# Patient Record
(demographics unavailable — no encounter records)

---

## 2024-10-08 NOTE — HISTORY OF PRESENT ILLNESS
[FreeTextEntry1] : 56 year old woman with CAD s/p PCI stenting, on DAPT, PPM, PFO s/p closure in 2022, HLD, HTN, DM, and stroke in 10/2022, NAVIN,  returning to neurology for follow up after stroke.  Last seen in March 2024.  No recurrent stroke symptoms at the time.  Returning today to report no new neurological symptoms.  Continues to have bouts of vertigo.  She feels that PT was very helpful in the past, and requesting a referral to PT again.  Otherwise, no new neuro complaints, including visual disturbance, speech difficulty, weakness in the arms and legs.   She does report persistent gait difficulty with the bouts of vertigo, and is concerned about walking in her home in the dark.    She does report some increase swelling in both legs after prolonged periods of walking or standing.  No other recent medical complaints, including chest pain, shortness of breath, abdominal pain.  No fevers, chills, weight loss.    Recently approved for disability.    PMHx: CAD: stents, takes DAPT HLD: crestor 40 HTN: HCTZ 25, Losartan 100m, amlodipine 10, metop 25 DM: jardiance, and insulin. Stroke  Shx: Lives alone. Not working. Denies tobacco and ETOH use.  On exam: GEN: NAD CV: rRR, s1, S2 PULM: CTAB NEURO: Awake, alert, oriented to month, date, year, normal naming, repetition, fluency. PUpils 3-2mm, symmetric, full VF's, no papilledema, normal EOM, no nystagmus, no facial weakness or dysarthria, tongue midline, palate symmetric. MOTOR: normal bulk and tone, no drift, 5/5 throughout to confrontation SENSORY: intact symmetrically to light touch COORDINATION: normal FNF, no dysdiadochokinesia GAIT": narrow based, negative RHomberg, able to tandem, and heel walk.  CTH images reviewed: R basal ganglia chronic appearing stroke.

## 2024-10-08 NOTE — ASSESSMENT
[FreeTextEntry1] : 56 year old woman with CAD, s/p stent, PPM, PFO s/p closure, ILD, stroke, residual post stroke headache, intermittent vertigo, DM, HTN, HLD, returning to stroke neurology for follow up. No new symptoms, but continuing to experience episodic vertigo. No significant findings on her neuro exam. CTH images showing the R basal ganglia stroke.   -continue goal normotension -continue DM treatment, goal A1c <7.0 -LDL at goal -continue dual antiplatelet therapy for now.  Instructed patient to follow up with cardiologist to ensure DAPT is stil lindicated at this time.  Was started in 2019 after stenting.   -has new diagnosis of NAVIN since last visit.  Now prescribed CPAP, but difficulty to tolerate.  She will pursue adjustments with her PMD. -referral to vestibular therapy -referral to ENT for ear fullness, and occasional tinnitus.     -return for follow up in 9-12 months.

## 2024-10-23 NOTE — PHYSICAL EXAM
[Alert] : alert [Well Nourished] : well nourished [Healthy Appearance] : healthy appearance [Obese] : obese [No Acute Distress] : no acute distress [Well Developed] : well developed [Normal Voice/Communication] : normal voice communication [Normal Sclera/Conjunctiva] : normal sclera/conjunctiva [No Proptosis] : no proptosis [No Neck Mass] : no neck mass was observed [No LAD] : no lymphadenopathy [Supple] : the neck was supple [No Respiratory Distress] : no respiratory distress [No Edema] : no peripheral edema [Pedal Pulses Normal] : the pedal pulses are present [Right foot was examined, including] : right foot ~C was examined, including visual inspection with sensory and pulse exams [Left foot was examined, including] : left foot ~C was examined, including visual inspection with sensory and pulse exams [2+] : 2+ in the dorsalis pedis [Normal Affect] : the affect was normal [Normal Insight/Judgement] : insight and judgment were intact [Normal Mood] : the mood was normal [Normal Rate] : heart rate was normal

## 2024-10-24 NOTE — ASSESSMENT
[FreeTextEntry1] : 56-year-old female with type 2 diabetes mellitus with retinopathy, neuropathy, hypertension, vitamin D insufficiency, vitamin B12 insufficiency, GERD, thyroid nodule, obesity, CAD/stent in 2021, cerebellar stroke status post PFO closure and PPM, here for follow-up.  1. T2DM Hemoglobin A1c from September 2024 was 7.3.%.  [employer] CGM was downloaded, interpreted, and reviewed today. See scanned download in chart. Date range 10/10/2024 to 10/23/2024. Average glucose 209mg/dL, glucose variability 22.2%, GMI 8.3%, target range 26%, high 57%, very high 17%, low 0%, very low 0%. Continue Jardiance 25mg, Novolog 20 units before meals + sliding scale, and Levemir 10-14 units qhs.  Advised on importance of taking insulin prior to meals for glycemic control. No changes to medications at this time given patient was taking insulin after meal.    Last ophthalmology exam was April 2023. She has a history of retinopathy requiring laser photocoagulation and intraocular injections. Has not seen an Ophthalmologist as of yet as she has been looking for a new one. Patient referred to Dr. Arita, contact info provided. Avoid GLP-1 agonist due to retinopathy. She has neuropathy. Denies nephropathy.  follows with Dr. Carreon. Urine Microalbumin ordered.  Continue rosuvastatin 40 mg daily. LDL is at goal (45).  Continue losartan 100 mg daily.  2. Thyroid nodule US Thyroid from March 2024 showed Right lobe: slightly hypoechoic nodule with coarse calcification in the mid to lower pole measuring 1.3 x 0.9 x 1.0 cm (TI-RAD 4). & a small spongiform-appearing nodule in the mid to lower pole measuring up to 0.6 cm. Left Lobe: a small spongiform-appearing nodule in the lower pole measuring up to 0.5 cm. & a colloid cyst in the midpole measuring up to 0.4 cm. per Dr. Andres recommendations March 2024, pt was advised to go for FNA of right thyroid nodule. Patient declines biopsy of right thyroid nodule. Potential risk including possibility of thyroid carcinoma reviewed with patient in detail. Patient verbalized understanding. Dr. Andres aware.  TSH from Sept 2024 normal.   3. Obesity Lifestyle modification. She is on Contrave 8-90mg

## 2024-10-24 NOTE — HISTORY OF PRESENT ILLNESS
[FreeTextEntry1] : CC: Diabetes 56-year-old female with type 2 diabetes mellitus with retinopathy, neuropathy, hypertension, vitamin D insufficiency, vitamin B12 insufficiency, GERD, thyroid nodule, obesity, CAD/stent in 2021, cerebellar stroke, PFO closure, PPM, here for follow-up.  Diabetes was diagnosed in 2018. She currently takes Jardiance 25mg, Novolog 20 units with meals, and Levemir 10-14 units. She was previously on Levemir 20 units but decreased dose on her own due to nighttime awakenings. Denies episodes of hypoglycemia or s/s. She states she wakes up in the middle of the night hungry, and takes her oral medications in the AM with a small meal prior to taking insulin. She was previously on Cequr patch, however stopped using Cequr due to skin irritation and rash.  [employer] CGM downloaded, interpreted, and reviewed.  Last ophthalmology exam was April 2023. She has a history of retinopathy requiring laser photocoagulation and intraocular injections. Has not seen an Ophthalmologist as of yet as she has been looking for a new one.  She has neuropathy. Denies nephropathy. She is on rosuvastatin 40 mg daily. She is on losartan 100 mg daily.  US Thyroid done 03/2024 showed Right lobe: slightly hypoechoic nodule with coarse calcification in the mid to lower pole measuring 1.3 x 0.9 x 1.0 cm (TI-RAD 4). & a small spongiform-appearing nodule in the mid to lower pole measuring up to 0.6 cm. Left Lobe: a small spongiform-appearing nodule in the lower pole measuring up to 0.5 cm. & a colloid cyst in the midpole measuring up to 0.4 cm. per Dr. Andres recommendations March 2024, pt was advised to go for FNA of right thyroid nodule. Patient declined biopsy of right thyroid nodule.

## 2024-10-24 NOTE — ASSESSMENT
[FreeTextEntry1] : 56-year-old female with type 2 diabetes mellitus with retinopathy, neuropathy, hypertension, vitamin D insufficiency, vitamin B12 insufficiency, GERD, thyroid nodule, obesity, CAD/stent in 2021, cerebellar stroke status post PFO closure and PPM, here for follow-up.  1. T2DM Hemoglobin A1c from September 2024 was 7.3.%.  [employer] CGM was downloaded, interpreted, and reviewed today. See scanned download in chart. Date range 10/10/2024 to 10/23/2024. Average glucose 209mg/dL, glucose variability 22.2%, GMI 8.3%, target range 26%, high 57%, very high 17%, low 0%, very low 0%. Continue Jardiance 25mg, Novolog 20 units before meals + sliding scale, and Levemir 10-14 units qhs.  Advised on importance of taking insulin prior to meals for glycemic control. No changes to medications at this time given patient was taking insulin after meal.    Last ophthalmology exam was April 2023. She has a history of retinopathy requiring laser photocoagulation and intraocular injections. Has not seen an Ophthalmologist as of yet as she has been looking for a new one. Patient referred to Dr. Arita, contact info provided. Avoid GLP-1 agonist due to retinopathy. She has neuropathy. Denies nephropathy.  follows with Dr. Carreon. Urine Microalbumin ordered.  Continue rosuvastatin 40 mg daily. LDL is at goal (45).  Continue losartan 100 mg daily.  2. Thyroid nodule US Thyroid from March 2024 showed Right lobe: slightly hypoechoic nodule with coarse calcification in the mid to lower pole measuring 1.3 x 0.9 x 1.0 cm (TI-RAD 4). & a small spongiform-appearing nodule in the mid to lower pole measuring up to 0.6 cm. Left Lobe: a small spongiform-appearing nodule in the lower pole measuring up to 0.5 cm. & a colloid cyst in the midpole measuring up to 0.4 cm. per Dr. Andres recommendations March 2024, pt was advised to go for FNA of right thyroid nodule. Patient declines biopsy of right thyroid nodule. Potential risk including possibility of thyroid carcinoma reviewed with patient in detail. Patient verbalized understanding. Dr. Andres aware.  TSH from Sept 2024 normal.   3. Obesity Lifestyle modification. She is on Contrave 8-90mg

## 2024-10-24 NOTE — REVIEW OF SYSTEMS
[All other systems negative] : All other systems negative [Dizziness] : dizziness [de-identified] : forgetfulness

## 2024-10-28 NOTE — ASSESSMENT
[FreeTextEntry1] : 1. CKD Stage 3 a: (non-proteinuric) Patient's baseline serum creatinine is 1.2-1.4mg/dL, with a corresponding eGFR of around 44-50 ml/min/1.73m2.  The etiology of CKD is likely due to diabetic nephropathy.  US renal shows normal kidneys; no evidence of obstructive uropathy.  Continue jardiance for renal protection.   2. HTN - Goal BP for patient is < 130/80.  Patient's current BP is well-controlled. Continue amlodipine, metoprolol, and losartan 100mg daily.  Continue a low salt diet.  3. Medication toxicity monitoring. Medication dose reviewed. Since she is taking losartan and hydrochlorothiazide,  we will monitor for hyperkalemia and hypokalemia.   Follow up in 6 months.

## 2024-10-28 NOTE — HISTORY OF PRESENT ILLNESS
[FreeTextEntry1] : Ms. ZAHRA RYAN is a 56-year-old woman with a PMH of HTN and Diabetes who presents to [facility] for the management of Acute kidney injury  Kidney history: Ms. RYAN has a baseline serum creatinine of 0.9-1.02mg/dL, with a corresponding eGFR of 65 ml/min/1.73m2. But in June, noted that her serum creatinine went up to 1.58 then 1.41mg/dL. Her blood glucose in June 2023 were high mid 200s and 300s.  Urinalyses showed no rbc, wbc and UACR showed 26mg/g. US kidney in 3/2024 - normal kidneys. No nephrolithiasis.   Stopped metformin and started Jardiance since April 2023.  Olmesartan was stopped - now switched to losartan  She states that sometimes, she gets headache and she would take an extra dose of losartan. She does that 1-2 times a week. The last time was more than a week ago (mid June 2023)  She denies having nausea/vomiting/diarrhea. She has a good appetite. She denies hematuria, frothy urine or dysuria. She denies shortness of breath, chest pain, headache, edema. No hand tremors. She  denies skin rash, joint pains or hair loss. She denies NSAID use or herbal supplements. No family history of kidney disease.  DM: Diagnosed in 2019 Currently on insulin and Jardiance.  A1C is 11.1% in June 2023  HTN: Diagnosed in 2019.  Currently on losartan 100mg daily, amlodipine 10mg, and hydrochlorothiazide 25mg daily.  She follows a low salt diet.  Starting to feel more lightheaded starting in March 2023, especially when she tries to get up quickly.   Nephrolithiasis: None  ---------------------- Interval history: She was in the ER several days ago for chest pain. Troponin was negative. She is scheduled to see Dr. Davies for a nuclear stress test on Nov 4, 2024. Serum creatinine in the ER was 1.27.   Serum creatinine trend (mg/dL): 10/2024 - 1.27 4/2024 - 1.27 1/2024 - 1.42 9/2023 - 1.29 7/2023 - 1.41  Currently, she denies having nausea/vomiting/metallic taste in Her mouth. She has a good appetite. She denies hematuria, frothy urine or dysuria. She denies shortness of breath, chest pain, headache, edema. No hand tremors. She denies NSAID use or herbal supplements.

## 2024-10-28 NOTE — PHYSICAL EXAM
[General Appearance - Alert] : alert [General Appearance - In No Acute Distress] : in no acute distress [General Appearance - Well Nourished] : well nourished [Sclera] : the sclera and conjunctiva were normal [Outer Ear] : the ears and nose were normal in appearance [Hearing Threshold Finger Rub Not Wright] : hearing was normal [Nasal Cavity] : the nasal mucosa and septum were normal [Neck Appearance] : the appearance of the neck was normal [Neck Cervical Mass (___cm)] : no neck mass was observed [Respiration, Rhythm And Depth] : normal respiratory rhythm and effort [Exaggerated Use Of Accessory Muscles For Inspiration] : no accessory muscle use [Auscultation Breath Sounds / Voice Sounds] : lungs were clear to auscultation bilaterally [Heart Rate And Rhythm] : heart rate was normal and rhythm regular [Heart Sounds] : normal S1 and S2 [Heart Sounds Gallop] : no gallops [Edema] : there was no peripheral edema [Veins - Varicosity Changes] : there were no varicosital changes [Abdomen Soft] : soft [Abdomen Tenderness] : non-tender [Abdomen Mass (___ Cm)] : no abdominal mass palpated [No CVA Tenderness] : no ~M costovertebral angle tenderness [No Spinal Tenderness] : no spinal tenderness [Abnormal Walk] : normal gait [Musculoskeletal - Swelling] : no joint swelling seen [Skin Color & Pigmentation] : normal skin color and pigmentation [Skin Turgor] : normal skin turgor [] : no rash [Impaired Insight] : insight and judgment were intact [Affect] : the affect was normal [Mood] : the mood was normal

## 2025-01-07 NOTE — HEALTH RISK ASSESSMENT
[No] : In the past 12 months have you used drugs other than those required for medical reasons? No [0] : 2) Feeling down, depressed, or hopeless: Not at all (0) [PHQ-2 Negative - No further assessment needed] : PHQ-2 Negative - No further assessment needed [Never] : Never [[id number]] : 0

## 2025-01-07 NOTE — ADDENDUM
[FreeTextEntry1] : I, Wilmer Sherman, acted as a scribe on behalf of Dr. Te May MD, on 01/07/2025.   All medical entries made by the scribe were at my, Dr. Te May MD, direction and personally dictated by me on 01/07/2025. I have reviewed the chart and agree that the record accurately reflects my personal performance of the history, physical exam, assessment and plan. I have also personally directed, reviewed, and agreed with the chart.

## 2025-01-07 NOTE — HISTORY OF PRESENT ILLNESS
[FreeTextEntry1] : Patient presents today for longitudinal care. [de-identified] : Patient is a 56yr old female who presents today for longitudinal care.  Patient is doing well overall. Patient reports hospital visit in November due to believing she was experiencing MI, reports hospital had intention of placing another stent but opted that it was unnecessary. Reports following with aspirin as prescribed. Pt reports following with NEPH and CARD. Reports surgeon recommended that she lose weight, discussed possible weight loss medications. Discussed possibility of visiting an obesity doctor. Pt reports following with [facility]. Pt also c/o of episodes of cramps in her legs which onset when she travelled in November. Pt reports her feet get heavy and stiff and the pain is unbearable, reports cramps occur at different intervals, unaware of triggers. Confirms staying hydrated. Recommended to visit vascular doctor by POD, discussed referral on this visit. Recommended to try Magnesium 400MG to help with cramps. Reports sugar levels have not been controlled, attributed to holiday eating. Pt also c/o persistent headaches, reports changing bed, denies relief. Denies wearing C- Pap, tried for a few nights and was unable to sleep.    Denies any CP, chest tightness or SOB. Denies any abdominal pain, urinary symptom, or change in bowel habits. Denies any fever, chills, or night sweats.

## 2025-01-07 NOTE — ASSESSMENT
[FreeTextEntry1] : Follow up visit: Cramps, muscle, general -BP is stable. Continue current management. - Check A1c. Lipid panel, vitamin levels, urine analysis, TSH - Vascular Surgery Referral  -dental surgery for oral appliance for sleep apnea   - RTO in 3 months     Pt verbalized understanding and will reach should any questions/concerns occur.

## 2025-01-16 NOTE — ASSESSMENT
[FreeTextEntry1] : 57y/o female w/ hx of CVA 2022, cardiac stents on blood thinners and NAVIN who came in for ear pain right>left, right ear popping and intermittent swollen glands.   TMJ Ear exam: normal, notable clicking when she opens and closes her mouth  -She feels her hearing has been okay -TMJ handout given to patient  -Recommend to f/u with her dentist for a   -  feels her hearing is good so defers audio  Ear itching -Rx: mometasone for ear itching

## 2025-01-16 NOTE — END OF VISIT
[FreeTextEntry3] : I personally saw and examined Ms. ZAHRA RYAN in detail this visit today. I personally reviewed the HPI, PMH, FH, SH, ROS and medications/allergies. I have spoken to JOSE Douglas regarding the history and have personally determined the assessment and plan of care, and documented this myself. I was present and participated in all key portions of the encounter and all procedures noted above. I have made changes in the body of the note where appropriate.   Attesting Faculty: See Attending Signature Below

## 2025-01-16 NOTE — PHYSICAL EXAM
[Normal] : mucosa is normal [Midline] : trachea located in midline position [de-identified] : TMj click bialteral

## 2025-01-16 NOTE — REVIEW OF SYSTEMS
[Ear Pain] : ear pain [Ear Itch] : ear itch [Dizziness] : dizziness [Negative] : Heme/Lymph [As Noted in HPI] : as noted in HPI

## 2025-01-16 NOTE — HISTORY OF PRESENT ILLNESS
[de-identified] : 57y/o female w/ hx of CVA 2022, cardiac stents on blood thinners and NAVIN who came in for pain in her right ear, but she will get pain in both. She also has a lot of itching. She will have popping in the right ear. She states she leila feel a marble size lump in her neck bilateral that will come and go. She states her ear pain started after her stroke on and off. She has episodes of dizziness that she has been f/u with neurology with after she had a CVA. She has gum problems that she is seeing a dentist for.  Pt denies any ear drainage or tinnitus or noticeable hearing loss. She has noticed her nose has been running more often. She denies any allergies

## 2025-03-18 NOTE — HISTORY OF PRESENT ILLNESS
[FreeTextEntry1] : 57-year-old female DM with retinopathy and neuropathy HTN, HLD, GERD, CAD , CVA, PFO, PPM, with daily headaches. Saw neuro last year. Recommended injection but did not take it. Cannot tolerate CPAP.

## 2025-03-18 NOTE — DISCUSSION/SUMMARY
[EKG obtained to assist in diagnosis and management of assessed problem(s)] : EKG obtained to assist in diagnosis and management of assessed problem(s) [FreeTextEntry1] : The patient is a 57-year-old female DM with retinopathy and neuropathy HTN, HLD, GERD, CAD , CVA, PFO, PPM, with HA probably from not using CPAP. #1 CV- CAD 3PCI 2021 at [facility], Dr. Hager, c/w DAPT, f/u cath negative. #2 PPM- [employer] device for sinus node dysfunction, device check here no events 94% paced, 9.5 years battery life.  #3 PFO- s/p closure 2022 at [facility], Dr. Ector Concepcion, no longer requires prophylaxis and recent ECHO well seated device. #4 Neuro- CVA 2022 at [facility], needs to f/u for headaches #5 HTN- c/w amlodipine 10mg, HCTZ 25mg, Losartan 100mg, Metoprolol 25mg #6 HLD- c/w rosuvastatin 40mg #7 DM- on Novolug, Basaglar, Jardiance, suboptimal control, f/u Dr. Andres #8 Pulm- NAVIN, needs CPAP #9 General- retinopathy and neuropathy, f/u with ophtho and podiatry, venous doppler for RLE

## 2025-03-18 NOTE — DISCUSSION/SUMMARY
[FreeTextEntry1] : The patient is a 57-year-old female DM with retinopathy and neuropathy HTN, HLD, GERD, CAD , CVA, PFO, PPM, with HA probably from not using CPAP. #1 CV- CAD 3PCI 2021 at [facility], Dr. Hager, c/w DAPT, f/u cath negative. #2 PPM- [employer] device for sinus node dysfunction, device check here no events 94% paced, 9.5 years battery life.  #3 PFO- s/p closure 2022 at [facility], Dr. Ector Concepcion, no longer requires prophylaxis and recent ECHO well seated device. #4 Neuro- CVA 2022 at [facility], needs to f/u for headaches #5 HTN- c/w amlodipine 10mg, HCTZ 25mg, Losartan 100mg, Metoprolol 25mg #6 HLD- c/w rosuvastatin 40mg #7 DM- on Novolug, Basaglar, Jardiance, suboptimal control, f/u Dr. Andres #8 Pulm- NAVIN, needs CPAP #9 General- retinopathy and neuropathy, f/u with ophtho and podiatry, venous doppler for RLE [EKG obtained to assist in diagnosis and management of assessed problem(s)] : EKG obtained to assist in diagnosis and management of assessed problem(s)
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
## 2025-03-23 NOTE — REVIEW OF SYSTEMS
[Headaches] : headaches [Tremors] : tremors [All other systems negative] : All other systems negative [FreeTextEntry9] : pain in groin area [de-identified] : forgetfulness

## 2025-03-23 NOTE — REVIEW OF SYSTEMS
[Headaches] : headaches [Tremors] : tremors [All other systems negative] : All other systems negative [FreeTextEntry9] : pain in groin area [de-identified] : forgetfulness

## 2025-03-23 NOTE — HISTORY OF PRESENT ILLNESS
[FreeTextEntry1] : CC: Diabetes This is a 57-year-old female with type 2 diabetes mellitus with retinopathy, osteopenia, neuropathy, hypertension, vitamin D insufficiency, vitamin B12 insufficiency, GERD, thyroid nodules, obesity, CAD/stent in 2021, cerebellar stroke, PFO closure, PPM, here for follow-up.  She currently takes 16-20 units of Novolog before each meal depending on carbohydrate intake and blood glucose levels, Levemir 12 units at bedtime, and Jardiance 25 mg in the morning daily.  Freestyle Brody CGM downloaded, interpreted, and reviewed.  Diabetes was diagnosed in 2018. Last ophthalmology exam was 2/2025. She has a history of retinopathy requiring laser photocoagulation and intraocular injections. She has neuropathy. Denies nephropathy. She is on rosuvastatin 40 mg daily. She is on losartan 100 mg daily. Reports right groin pain, tremors in hands, headaches, and forgetfulness.   Thyroid ultrasound from 3/7/24 showed a right lower pole 1.3 x 0.9 x 1.0 cm thyroid nodule (TR4) and left lower pole 0.5 cm spongiform-appearing nodule.   DEXA from 10/1/24 showed L1-L4 T-score -1.9, left femoral neck T-score -1.0, left total hip T-score -0.3.

## 2025-03-23 NOTE — ASSESSMENT
[FreeTextEntry1] : This is a 57-year-old female with type 2 diabetes mellitus with retinopathy, osteopenia, neuropathy, hypertension, vitamin D insufficiency, vitamin B12 insufficiency, GERD, thyroid nodules, obesity, CAD/stent in 2021, cerebellar stroke status post PFO closure and PPM, here for follow-up.  1. T2DM Recent hbA1c from 01/2025 was 7.3%.  She currently takes 16-20 units of Novolog before each meal depending on carbohydrate intake and blood glucose levels, Levemir 12 units at bedtime, and Jardiance 25 mg in the morning daily. She was on CeQur patch in the past however discontinued it due to rash.  Dexcom CGM was downloaded, interpreted, and reviewed today. Date range 03/07/25 - 03/20/25. Average glucose 188 mg/dL, glucose variability 18.7%, GMI 7.8%, target range 46%, high 49%, very high 5%, low 0%, very low 0%. Hyperglycemia noted, per patient due to eating fruits. Nutrition education provided. Advised to eat fruit with protein to prevent hyperglycemia. Patient will implement dietary changes. Will download CGM in 2 weeks. If no improvement, will consider making adjustments in insulin at that time.  Last ophthalmology exam was February 2025. She has a history of retinopathy requiring laser photocoagulation and intraocular injections. Avoid GLP-1 receptor agonists at this time.  She has neuropathy. Has nephropathy and follows with Dr. Carreon.  She is on rosuvastatin 40 mg daily. LDL is 45.  She is on losartan 100 mg daily. 2. Thyroid nodules Thyroid ultrasound from 3/7/24 showed a right lower pole 1.3 x 0.9 x 1.0 cm thyroid nodule (TR4) and left lower pole 0.5 cm spongiform-appearing nodule.  She was referred for US guided FNA of right 1.4 cm TR4 thyroid nodule, however, she declined. Per patient, she declines FNA. Reviewed possible risk of thyroid carcinoma. Declines thyroid ultrasound as well.   Recent TSH normal.  3. Osteopenia DEXA from 10/1/24 showed L1-L4 T-score -1.9, left femoral neck T-score -1.0, left total hip T-score -0.3.  FRAX major is 3.1%, hip 0.1%.  4. Obesity Lifestyle modification.
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
## 2025-04-14 NOTE — PHYSICAL EXAM
[General Appearance - Alert] : alert [General Appearance - In No Acute Distress] : in no acute distress [General Appearance - Well Nourished] : well nourished [Sclera] : the sclera and conjunctiva were normal [Outer Ear] : the ears and nose were normal in appearance [Hearing Threshold Finger Rub Not [place]] : hearing was normal [Nasal Cavity] : the nasal mucosa and septum were normal [Neck Appearance] : the appearance of the neck was normal [Neck Cervical Mass (___cm)] : no neck mass was observed [Respiration, Rhythm And Depth] : normal respiratory rhythm and effort [Exaggerated Use Of Accessory Muscles For Inspiration] : no accessory muscle use [Auscultation Breath Sounds / Voice Sounds] : lungs were clear to auscultation bilaterally [Heart Rate And Rhythm] : heart rate was normal and rhythm regular [Heart Sounds] : normal S1 and S2 [Heart Sounds Gallop] : no gallops [Edema] : there was no peripheral edema [Veins - Varicosity Changes] : there were no varicosital changes [Abdomen Soft] : soft [Abdomen Tenderness] : non-tender [Abdomen Mass (___ Cm)] : no abdominal mass palpated [No CVA Tenderness] : no ~M costovertebral angle tenderness [No Spinal Tenderness] : no spinal tenderness [Abnormal Walk] : normal gait [Musculoskeletal - Swelling] : no joint swelling seen [Skin Color & Pigmentation] : normal skin color and pigmentation [Skin Turgor] : normal skin turgor [] : no rash [Impaired Insight] : insight and judgment were intact [Affect] : the affect was normal [Mood] : the mood was normal

## 2025-04-14 NOTE — ASSESSMENT
[FreeTextEntry1] : 1. CKD Stage 3 a: (non-proteinuric) Patient's baseline serum creatinine is 1.2-1.4mg/dL, with a corresponding eGFR of around 44-50 ml/min/1.73m2.  The etiology of CKD is likely due to diabetic nephropathy.  US renal shows normal kidneys; no evidence of obstructive uropathy.  Continue jardiance for renal protection.   2. HTN - Goal BP for patient is < 130/80.  Patient's current sitting BP is well-controlled. She also has orthostatic hypotension. - Discussed with patient. She is to take her time when going from sitting to standing position. She does not want to change her BP medication at the moment. - Continue amlodipine, metoprolol, and losartan 100mg daily.   3. Medication toxicity monitoring. Medication dose reviewed. Since she is taking losartan and hydrochlorothiazide,  we will monitor for hyperkalemia and hypokalemia. Her latest potassium level is acceptable.   Follow up in 6 months.

## 2025-04-14 NOTE — HISTORY OF PRESENT ILLNESS
[FreeTextEntry1] : Ms. ZAHRA RYAN is a 57-year-old woman with a PMH of HTN and Diabetes who presents [facility] for the management of Acute kidney injury  Kidney history: Ms. RYAN has a baseline serum creatinine of 0.9-1.02mg/dL, with a corresponding eGFR of 65 ml/min/1.73m2. But in June, noted that her serum creatinine went up to 1.58 then 1.41mg/dL. Her blood glucose in June 2023 were high mid 200s and 300s.  Urinalyses showed no rbc, wbc and UACR showed 26mg/g. US kidney in 3/2024 - normal kidneys. No nephrolithiasis.   Stopped metformin and started Jardiance since April 2023.  Olmesartan was stopped - now switched to losartan  She states that sometimes, she gets headache and she would take an extra dose of losartan. She does that 1-2 times a week. The last time was more than a week ago (mid June 2023)  She denies having nausea/vomiting/diarrhea. She has a good appetite. She denies hematuria, frothy urine or dysuria. She denies shortness of breath, chest pain, headache, edema. No hand tremors. She  denies skin rash, joint pains or hair loss. She denies NSAID use or herbal supplements. No family history of kidney disease.  DM: Diagnosed in 2019 Currently on insulin and Jardiance.  A1C is 11.1% in June 2023  HTN: Diagnosed in 2019.  Currently on losartan 100mg daily, amlodipine 10mg, and hydrochlorothiazide 25mg daily.  She follows a low salt diet.  Starting to feel more lightheaded starting in March 2023, especially when she tries to get up quickly.   Nephrolithiasis: None  ---------------------- Interval history: She has been well overall. But noted that she has had dizziness when getting up too quickly. No chest pain/shortness of breath during that time.   Serum creatinine trend (mg/dL): 1/7/25 - 1.43 (eGFR 43) 9/2024 - 1.32 (eGFR 47) 4/2024 - 1.27 1/2024 - 1.42 9/2023 - 1.29 7/2023 - 1.41  Currently, she denies having nausea/vomiting/metallic taste in Her mouth. She has a good appetite. She denies hematuria, frothy urine or dysuria. She denies shortness of breath, chest pain, headache, edema. No hand tremors. She denies NSAID use or herbal supplements.

## 2025-07-07 NOTE — PHYSICAL EXAM
[No Acute Distress] : no acute distress [Well Nourished] : well nourished [Well Developed] : well developed [No Respiratory Distress] : no respiratory distress  [No Accessory Muscle Use] : no accessory muscle use [Clear to Auscultation] : lungs were clear to auscultation bilaterally [Normal Rate] : normal rate  [Regular Rhythm] : with a regular rhythm [Soft] : abdomen soft [Non Tender] : non-tender [Normal Bowel Sounds] : normal bowel sounds [No CVA Tenderness] : no CVA  tenderness [No Spinal Tenderness] : no spinal tenderness [No Joint Swelling] : no joint swelling [Grossly Normal Strength/Tone] : grossly normal strength/tone [Coordination Grossly Intact] : coordination grossly intact [No Focal Deficits] : no focal deficits [Normal Gait] : normal gait [Deep Tendon Reflexes (DTR)] : deep tendon reflexes were 2+ and symmetric [Normal Affect] : the affect was normal [Normal Insight/Judgement] : insight and judgment were intact [Normal Sclera/Conjunctiva] : normal sclera/conjunctiva [PERRL] : pupils equal round and reactive to light [EOMI] : extraocular movements intact [No Edema] : there was no peripheral edema [No Extremity Clubbing/Cyanosis] : no extremity clubbing/cyanosis

## 2025-07-07 NOTE — HISTORY OF PRESENT ILLNESS
[FreeTextEntry8] : 57-year-old female here for acute visit with c/o falling in home.  Reports she fell in her kitchen 2 days ago after she tripped and loss balance.  Denies dizziness, confusion or loss of consciousness prior to falling. Since falling, had tenderness on the left side of her head, back and scratches on her left calf. She did not get evaluated at the ER. She took Tylenol 1x with relief.    Denies nausea, vomiting, blurry vision or double vision, sensitivity to light, ringing in the ears.    Also reports she felt chest heaviness this morning while laying down. States it lasted for about 30 mins and went away on its own.  Denies SOB, palpitations, chest pain. denies abdominal pain or nausea.

## 2025-07-07 NOTE — ASSESSMENT
[FreeTextEntry1] : EKG done at visit, given reported chest heaviness. EKG reviewed; stable.  BP stable.   Discussed Tylenol for pain relief.  Advised to f/u for persisting s/s or change in condition.

## 2025-07-28 NOTE — CONSULT LETTER
[Dear  ___] : Dear  [unfilled], [Consult Letter:] : I had the pleasure of evaluating your patient, [unfilled]. [Please see my note below.] : Please see my note below. [Consult Closing:] : Thank you very much for allowing me to participate in the care of this patient.  If you have any questions, please do not hesitate to contact me. [Sincerely,] : Sincerely, [FreeTextEntry3] : Juvencio Harp M.D., F.LUTHER., R.P.KEDARI.  of Vascular Surgery Assistant Professor of Radiology Director of Endovascular Program/ [facility]

## 2025-07-28 NOTE — ASSESSMENT
[Arterial/Venous Disease] : arterial/venous disease [FreeTextEntry1] : 57-year-old female with intermittent bilateral lower extremity discomfort R >L  Pulses are intact throughout the lower extremities bilaterally.  There is no current evidence of peripheral vascular disease based on clinical examination.  Patient with right groin and hip pain.  Recommend orthopedic evaluation.  Suspect venous insufficiency.  Recommend compression stockings, leg elevation and exercise.  Patient to return to office for venous duplex of bilateral lower extremities.

## 2025-07-28 NOTE — HISTORY OF PRESENT ILLNESS
[FreeTextEntry1] : Patient is a 57-year-old female with history significant for coronary artery disease s/p PCI x 3, hypertension, hyperlipidemia, type 2 diabetes, and cerebellar stroke in 2022 who presents the office today for evaluation of bilateral lower extremity discomfort.  Patient reports symptoms have been present for a number of years and are worse in the right lower extremity. Patient also endorses right groin and hip pain.  Denies rest pain or claudication symptoms.  Denies tissue loss.  No history of smoking. 2018 05:55

## 2025-07-28 NOTE — PHYSICAL EXAM
[Normal Breath Sounds] : Normal breath sounds [Normal Rate and Rhythm] : normal rate and rhythm [2+] : left 2+ [Ankle Swelling (On Exam)] : present [Ankle Swelling Bilaterally] : bilaterally  [Ankle Swelling On The Right] : mild [No Rash or Lesion] : No rash or lesion [Alert] : alert [Calm] : calm [Varicose Veins Of Lower Extremities] : not present [] : not present [de-identified] : Appears well, no acute distress noted [de-identified] : No facial asymmetry noted, tongue is midline [de-identified] : Right groin is soft and nontender [de-identified] : Intact